# Patient Record
Sex: FEMALE | Race: WHITE | Employment: FULL TIME | URBAN - METROPOLITAN AREA
[De-identification: names, ages, dates, MRNs, and addresses within clinical notes are randomized per-mention and may not be internally consistent; named-entity substitution may affect disease eponyms.]

---

## 2018-06-05 ENCOUNTER — OFFICE VISIT (OUTPATIENT)
Dept: ORTHOPEDIC SURGERY | Age: 43
End: 2018-06-05

## 2018-06-05 VITALS — HEIGHT: 64 IN | BODY MASS INDEX: 24.24 KG/M2 | WEIGHT: 142 LBS

## 2018-06-05 DIAGNOSIS — M22.42 CHONDROMALACIA OF LEFT PATELLA: Primary | ICD-10-CM

## 2018-06-05 DIAGNOSIS — M25.562 LEFT KNEE PAIN, UNSPECIFIED CHRONICITY: ICD-10-CM

## 2018-06-05 PROCEDURE — L1812 KO ELASTIC W/JOINTS PRE OTS: HCPCS | Performed by: INTERNAL MEDICINE

## 2018-06-05 PROCEDURE — 99203 OFFICE O/P NEW LOW 30 MIN: CPT | Performed by: INTERNAL MEDICINE

## 2018-06-05 RX ORDER — MELOXICAM 15 MG/1
15 TABLET ORAL DAILY
Qty: 30 TABLET | Refills: 1 | Status: SHIPPED | OUTPATIENT
Start: 2018-06-05

## 2018-07-11 ENCOUNTER — OFFICE VISIT (OUTPATIENT)
Dept: ORTHOPEDIC SURGERY | Age: 43
End: 2018-07-11

## 2018-07-11 VITALS — WEIGHT: 142 LBS | HEIGHT: 64 IN | BODY MASS INDEX: 24.24 KG/M2

## 2018-07-11 DIAGNOSIS — M25.562 ACUTE PAIN OF LEFT KNEE: ICD-10-CM

## 2018-07-11 DIAGNOSIS — M22.42 CHONDROMALACIA OF LEFT PATELLA: ICD-10-CM

## 2018-07-11 DIAGNOSIS — S83.242A TEAR OF MEDIAL MENISCUS OF LEFT KNEE, CURRENT, UNSPECIFIED TEAR TYPE, INITIAL ENCOUNTER: Primary | ICD-10-CM

## 2018-07-11 PROCEDURE — 99213 OFFICE O/P EST LOW 20 MIN: CPT | Performed by: INTERNAL MEDICINE

## 2018-07-12 NOTE — PROGRESS NOTES
r/o [facility]         Manolo Cherry MD.      Disclaimer: \"This note was dictated with voice recognition software. Though review and correction are routine, we apologize for any errors. \"

## 2018-07-16 ENCOUNTER — OFFICE VISIT (OUTPATIENT)
Dept: ORTHOPEDIC SURGERY | Age: 43
End: 2018-07-16

## 2018-07-16 VITALS — BODY MASS INDEX: 26.13 KG/M2 | WEIGHT: 142 LBS | HEIGHT: 62 IN

## 2018-07-16 DIAGNOSIS — M22.42 CHONDROMALACIA OF LEFT PATELLA: Primary | ICD-10-CM

## 2018-07-16 DIAGNOSIS — M67.52 PLICA OF KNEE, LEFT: ICD-10-CM

## 2018-07-16 PROCEDURE — 99213 OFFICE O/P EST LOW 20 MIN: CPT | Performed by: INTERNAL MEDICINE

## 2018-07-16 NOTE — PROGRESS NOTES
Chief Complaint:   Chief Complaint   Patient presents with    Knee Pain     f/u MRI results L knee pain          History of Present Illness:       Patient is a 38 y.o. female returns follow up for the above complaint. The patient was last seen approximately 1 weeksago. The symptoms show no change since the last visit. The patient has had further testing for the problem. She does not perceive any mechanical symptoms about the knee. Past Medical History:        Past Medical History:   Diagnosis Date    Anxiety     Depression     Hypertension     Osteopenia     Rapid heart beat         Present Medications:         Current Outpatient Prescriptions   Medication Sig Dispense Refill    meloxicam (MOBIC) 15 MG tablet Take 1 tablet by mouth daily For 10 days then daily when necessary thereafter 30 tablet 1    levothyroxine (SYNTHROID) 137 MCG tablet Take 137 mcg by mouth daily. 90 tablet 0    levothyroxine (SYNTHROID) 125 MCG tablet Take 1 tablet by mouth every other day. 30 tablet 3    diltiazem (DILTIA XT) 120 MG XR capsule Take 120 mg by mouth daily.  paroxetine (PAXIL CR) 25 MG CR tablet Take 25 mg by mouth every morning.  Cholecalciferol (VITAMIN D) 2000 UNIT CAPS Take 1 tablet by mouth daily. No current facility-administered medications for this visit. Allergies:      No Known Allergies        Review of Systems:    Pertinent items are noted in HPI       Vital Signs: There were no vitals filed for this visit. General Exam:     Constitutional: Patient is adequately groomed with no evidence of malnutrition    Physical Exam: left knee      Primary Exam:    Inspection:  No deformity atrophy or effusion      Palpation:  No palpable plica      Range of Motion:  Full range symmetric without pain      Strength:  Normal with SLR      Special Tests:  Negative patella apprehension      Skin: There are no rashes, ulcerations or lesions.       Gait: nonantalgic     Neurovascular - willian when necessary         Orders:        Orders Placed This Encounter   Procedures    Ambulatory referral to Physical Therapy     Referral Priority:   Routine     Referral Type:   Eval and Treat     Referral Reason:   Specialty Services Required     Requested Specialty:   Physical Therapy     Number of Visits Requested:   1         Lucero Velez MD.      Celio Fernandez: \"This note was dictated with voice recognition software. Though review and correction are routine, we apologize for any errors. \"

## 2018-07-19 PROBLEM — M22.42 CHONDROMALACIA OF LEFT PATELLA: Status: ACTIVE | Noted: 2018-07-19

## 2021-02-24 ENCOUNTER — APPOINTMENT (OUTPATIENT)
Dept: GENERAL RADIOLOGY | Age: 46
End: 2021-02-24
Payer: COMMERCIAL

## 2021-02-24 ENCOUNTER — HOSPITAL ENCOUNTER (EMERGENCY)
Age: 46
Discharge: HOME | End: 2021-02-24
Attending: EMERGENCY MEDICINE
Payer: COMMERCIAL

## 2021-02-24 VITALS
SYSTOLIC BLOOD PRESSURE: 160 MMHG | RESPIRATION RATE: 16 BRPM | HEART RATE: 72 BPM | DIASTOLIC BLOOD PRESSURE: 110 MMHG | TEMPERATURE: 97.4 F | OXYGEN SATURATION: 100 %

## 2021-02-24 DIAGNOSIS — R07.9 CHEST PAIN, UNSPECIFIED TYPE: Primary | ICD-10-CM

## 2021-02-24 LAB
A/G RATIO: 1.9 (ref 1.1–2.2)
ALBUMIN SERPL-MCNC: 4.9 G/DL (ref 3.4–5)
ALP BLD-CCNC: 72 U/L (ref 40–129)
ALT SERPL-CCNC: 21 U/L (ref 10–40)
ANION GAP SERPL CALCULATED.3IONS-SCNC: 10 MMOL/L (ref 3–16)
AST SERPL-CCNC: 24 U/L (ref 15–37)
BASOPHILS ABSOLUTE: 0 K/UL (ref 0–0.2)
BASOPHILS RELATIVE PERCENT: 0.5 %
BILIRUB SERPL-MCNC: 0.4 MG/DL (ref 0–1)
BUN BLDV-MCNC: 10 MG/DL (ref 7–20)
CALCIUM SERPL-MCNC: 9.7 MG/DL (ref 8.3–10.6)
CHLORIDE BLD-SCNC: 102 MMOL/L (ref 99–110)
CO2: 26 MMOL/L (ref 21–32)
CREAT SERPL-MCNC: 0.8 MG/DL (ref 0.6–1.1)
EKG ATRIAL RATE: 69 BPM
EKG DIAGNOSIS: NORMAL
EKG P AXIS: 49 DEGREES
EKG P-R INTERVAL: 150 MS
EKG Q-T INTERVAL: 408 MS
EKG QRS DURATION: 90 MS
EKG QTC CALCULATION (BAZETT): 437 MS
EKG R AXIS: -42 DEGREES
EKG T AXIS: 24 DEGREES
EKG VENTRICULAR RATE: 69 BPM
EOSINOPHILS ABSOLUTE: 0.1 K/UL (ref 0–0.6)
EOSINOPHILS RELATIVE PERCENT: 1.6 %
GFR AFRICAN AMERICAN: >60
GFR NON-AFRICAN AMERICAN: >60
GLOBULIN: 2.6 G/DL
GLUCOSE BLD-MCNC: 99 MG/DL (ref 70–99)
HCT VFR BLD CALC: 43.9 % (ref 36–48)
HEMOGLOBIN: 14.9 G/DL (ref 12–16)
LIPASE: 28 U/L (ref 13–60)
LYMPHOCYTES ABSOLUTE: 1.3 K/UL (ref 1–5.1)
LYMPHOCYTES RELATIVE PERCENT: 21.4 %
MCH RBC QN AUTO: 31 PG (ref 26–34)
MCHC RBC AUTO-ENTMCNC: 34 G/DL (ref 31–36)
MCV RBC AUTO: 91 FL (ref 80–100)
MONOCYTES ABSOLUTE: 0.4 K/UL (ref 0–1.3)
MONOCYTES RELATIVE PERCENT: 6.8 %
NEUTROPHILS ABSOLUTE: 4.1 K/UL (ref 1.7–7.7)
NEUTROPHILS RELATIVE PERCENT: 69.7 %
PDW BLD-RTO: 13.3 % (ref 12.4–15.4)
PLATELET # BLD: 331 K/UL (ref 135–450)
PMV BLD AUTO: 7.4 FL (ref 5–10.5)
POTASSIUM REFLEX MAGNESIUM: 4.3 MMOL/L (ref 3.5–5.1)
PRO-BNP: 33 PG/ML (ref 0–124)
RBC # BLD: 4.82 M/UL (ref 4–5.2)
SODIUM BLD-SCNC: 138 MMOL/L (ref 136–145)
TOTAL PROTEIN: 7.5 G/DL (ref 6.4–8.2)
TROPONIN: <0.01 NG/ML
WBC # BLD: 5.9 K/UL (ref 4–11)

## 2021-02-24 PROCEDURE — 71046 X-RAY EXAM CHEST 2 VIEWS: CPT

## 2021-02-24 PROCEDURE — 83690 ASSAY OF LIPASE: CPT

## 2021-02-24 PROCEDURE — 83880 ASSAY OF NATRIURETIC PEPTIDE: CPT

## 2021-02-24 PROCEDURE — 99283 EMERGENCY DEPT VISIT LOW MDM: CPT

## 2021-02-24 PROCEDURE — 6370000000 HC RX 637 (ALT 250 FOR IP): Performed by: PHYSICIAN ASSISTANT

## 2021-02-24 PROCEDURE — 80053 COMPREHEN METABOLIC PANEL: CPT

## 2021-02-24 PROCEDURE — 93005 ELECTROCARDIOGRAM TRACING: CPT | Performed by: EMERGENCY MEDICINE

## 2021-02-24 PROCEDURE — 84484 ASSAY OF TROPONIN QUANT: CPT

## 2021-02-24 PROCEDURE — 85025 COMPLETE CBC W/AUTO DIFF WBC: CPT

## 2021-02-24 RX ORDER — PANTOPRAZOLE SODIUM 20 MG/1
40 TABLET, DELAYED RELEASE ORAL DAILY
Qty: 30 TABLET | Refills: 0 | Status: SHIPPED | OUTPATIENT
Start: 2021-02-24

## 2021-02-24 RX ORDER — ASPIRIN 81 MG/1
324 TABLET, CHEWABLE ORAL ONCE
Status: COMPLETED | OUTPATIENT
Start: 2021-02-24 | End: 2021-02-24

## 2021-02-24 RX ADMIN — ASPIRIN 324 MG: 81 TABLET, CHEWABLE ORAL at 12:01

## 2021-02-24 RX ADMIN — LIDOCAINE HYDROCHLORIDE: 20 SOLUTION ORAL; TOPICAL at 12:01

## 2021-02-24 ASSESSMENT — ENCOUNTER SYMPTOMS
EYE REDNESS: 0
ABDOMINAL PAIN: 0
SHORTNESS OF BREATH: 0
COUGH: 1
VOMITING: 0
DIARRHEA: 0
NAUSEA: 0

## 2021-02-24 ASSESSMENT — PAIN DESCRIPTION - LOCATION: LOCATION: CHEST

## 2021-02-24 ASSESSMENT — PAIN SCALES - GENERAL: PAINLEVEL_OUTOF10: 8

## 2021-02-24 ASSESSMENT — PAIN DESCRIPTION - PAIN TYPE: TYPE: ACUTE PAIN

## 2021-02-24 ASSESSMENT — PAIN DESCRIPTION - DESCRIPTORS: DESCRIPTORS: ACHING;DISCOMFORT

## 2021-02-24 NOTE — ED PROVIDER NOTES
[facility]          PHYSICIAN ASSISTANT NOTE       Date of evaluation: 2/24/2021    Chief Complaint     Chest Pain      History of Present Illness     Mode Milton is a 39 y.o. female with PMH of Anxiety, Depression, Hypothyroidism who presents with Chest pain. Patient states that she woke up at 6:15 AM this morning and subsequently developed an intermittent squeezing substernal chest discomfort without radiation. She reports that intermittent squeezing, she feels a pressure-like sensation in this area. She initially attributed her symptoms to possible heartburn and took 2 Tums but without relief. She states that she has had symptoms similar to this in the past but symptoms were brief and resolved spontaneously. She states that she went to school nurse and was recommended she come to the emergency department. She denies any fevers, dizziness, diaphoresis, numbness or tingling, shortness of breath, abdominal pain, nausea or vomiting, change in bowel or bladder habits, leg swelling or pain. Patient states that she did have bronchitis a several weeks ago and was placed on Advair inhaler for maintenance. She reports a mild persistent cough. She did have some pleuritic chest discomfort several weeks ago but this is resolved. Patient states that she did have a CT scan with oral and IV contrast yesterday to reevaluate a previous \"fat deposit\" in her abdomen. Review of Systems     Review of Systems   Constitutional: Negative for chills, diaphoresis and fever. HENT: Negative for congestion. Eyes: Negative for redness. Respiratory: Positive for cough. Negative for shortness of breath. Cardiovascular: Positive for chest pain. Negative for leg swelling. Gastrointestinal: Negative for abdominal pain, diarrhea, nausea and vomiting. Genitourinary: Negative for difficulty urinating. Musculoskeletal: Negative for gait problem. Skin: Negative for wound. Neurological: Negative for dizziness. Psychiatric/Behavioral: The patient is not nervous/anxious. Past Medical, Surgical, Family, and Social History     She has a past medical history of Anxiety, Depression, Hypertension, Osteopenia, and Rapid heart beat. She has a past surgical history that includes Thyroidectomy (12/10). Her family history includes Cancer in her father and maternal grandmother; Heart Disease in her paternal grandfather; High Blood Pressure in her mother; Osteoporosis in her maternal grandmother. She reports that she quit smoking about 22 years ago. She has never used smokeless tobacco. She reports current alcohol use. Medications     Previous Medications    CHOLECALCIFEROL (VITAMIN D) 2000 UNIT CAPS    Take 1 tablet by mouth daily. DILTIAZEM (DILTIA XT) 120 MG XR CAPSULE    Take 120 mg by mouth daily. LEVOTHYROXINE (SYNTHROID) 125 MCG TABLET    Take 1 tablet by mouth every other day. LEVOTHYROXINE (SYNTHROID) 137 MCG TABLET    Take 137 mcg by mouth daily. MELOXICAM (MOBIC) 15 MG TABLET    Take 1 tablet by mouth daily For 10 days then daily when necessary thereafter    PAROXETINE (PAXIL CR) 25 MG CR TABLET    Take 25 mg by mouth every morning. Allergies     She has No Known Allergies. Physical Exam     INITIAL VITALS: BP: (!) 160/110,Temp: 97.4 °F (36.3 °C), Pulse: 72, Resp: 16, SpO2: 100 %   Physical Exam  Vitals signs and nursing note reviewed. Constitutional:       General: She is not in acute distress. HENT:      Head: Normocephalic and atraumatic. Mouth/Throat:      Pharynx: Oropharynx is clear. Eyes:      Extraocular Movements: Extraocular movements intact. Conjunctiva/sclera: Conjunctivae normal.   Neck:      Musculoskeletal: Neck supple. Cardiovascular:      Rate and Rhythm: Normal rate and regular rhythm. Pulmonary:      Effort: Pulmonary effort is normal. No respiratory distress. Chloride 102 99 - 110 mmol/L    CO2 26 21 - 32 mmol/L    Anion Gap 10 3 - 16    Glucose 99 70 - 99 mg/dL    BUN 10 7 - 20 mg/dL    CREATININE 0.8 0.6 - 1.1 mg/dL    GFR Non-African American >60 >60    GFR African American >60 >60    Calcium 9.7 8.3 - 10.6 mg/dL    Total Protein 7.5 6.4 - 8.2 g/dL    Albumin 4.9 3.4 - 5.0 g/dL    Albumin/Globulin Ratio 1.9 1.1 - 2.2    Total Bilirubin 0.4 0.0 - 1.0 mg/dL    Alkaline Phosphatase 72 40 - 129 U/L    ALT 21 10 - 40 U/L    AST 24 15 - 37 U/L    Globulin 2.6 g/dL   Lipase   Result Value Ref Range    Lipase 28.0 13.0 - 60.0 U/L   Troponin   Result Value Ref Range    Troponin <0.01 <0.01 ng/mL   Brain Natriuretic Peptide   Result Value Ref Range    Pro-BNP 33 0 - 124 pg/mL   EKG 12 Lead   Result Value Ref Range    Ventricular Rate 69 BPM    Atrial Rate 69 BPM    P-R Interval 150 ms    QRS Duration 90 ms    Q-T Interval 408 ms    QTc Calculation (Bazett) 437 ms    P Axis 49 degrees    R Axis -42 degrees    T Axis 24 degrees    Diagnosis       EKG performed in ER and to be interpreted by ER physician. Confirmed by MD, ER ([id number]),  Damon Adair ([id number]) on 2/24/2021 10:21:39 AM       RECENT VITALS:  BP: (!) 160/110, Temp: 97.4 °F (36.3 °C), Pulse: 72,Resp: 16, SpO2: 100 %     Procedures         ED Course     Nursing Notes, Past Medical Hx, Past Surgical Hx, Social Hx, Allergies, and Family Hx were reviewed.     The patient was given the followingmedications:  Orders Placed This Encounter   Medications    aspirin chewable tablet 324 mg    aluminum & magnesium hydroxide-simethicone (MAALOX) 30 mL, lidocaine viscous hcl (XYLOCAINE) 5 mL (GI COCKTAIL)    pantoprazole (PROTONIX) 20 MG tablet     Sig: Take 2 tablets by mouth daily     Dispense:  30 tablet     Refill:  0       CONSULTS:  None    MEDICAL DECISION Braulio Mckenzie / Megan Kramer / Marisela Mathew John Singh is a 39 y.o. female with PMH of Anxiety, Depression, Hypothyroidism who presents with intermittent substernal squeezing chest discomfort for the last couple of hours. Patient also reports pressure sensation in her chest.  She denies any other associated symptoms. On physical exam, patient is hypertensive with blood pressure 160/110. Vitals otherwise normal.  Heart rhythm regular. Lungs clear auscultation. Abdomen soft and nontender. EKG obtained revealed normal sinus rhythm, left axis deviation, no acute ischemic changes. IV access was established. Labs including CBC, CMP, Lipase, Troponin, BNP were obtained. Labs unremarkable. CXR negative. Patient was given a GI cocktail as well as 324 mg chewable aspirin. She reports that she has had no further squeezing discomfort since taking the above medications. Patient with atypical chest discomfort. She has a heart score of 0. Thus, she is low risk for adverse cardiac outcome. PERC score of zero. We will plan to place patient on Protonix and have her follow-up with her primary care provider. She is stable for discharge home at this time with strict return precautions. This patient was also evaluated by the attending physician. All care plans were discussed and agreed upon. Clinical Impression     1. Chest pain, unspecified type        Disposition     PATIENT REFERRED TO:  Ronny Mckenzie MD  [facility] [address] [facility] [address]  [phone]    Schedule an appointment as soon as possible for a visit       [facility] Emergency Department  [address]  [address]  [phone]    If symptoms worsen      DISCHARGE MEDICATIONS:  New Prescriptions    PANTOPRAZOLE (PROTONIX) 20 MG TABLET    Take 2 tablets by mouth daily        DISPOSITION  Discharge.        Gladys Dalal PA-C  02/24/21 [id number]

## 2021-02-24 NOTE — ED PROVIDER NOTES
ED Attending Attestation Note     Date of evaluation: 2/24/2021    This patient was seen by the advance practice provider. I have seen and examined the patient, agree with the workup, evaluation, management and diagnosis. The care plan has been discussed. I have reviewed the ECG and concur with the NOELLE's interpretation. My assessment reveals patient with squeezing CP since this am, took antacid for it. No reproducible tenderness in abdomen or chest.  Feels like heartburn following GI cocktail here and cardiac workup negative in setting of HEART score = 0. Will start PPI and have patient followup with PCP.      Melissa Kyle MD  02/24/21 [id number]

## 2022-06-10 LAB
ANTI-THYROGLOB ABS: 15 IU/ML
FERRITIN: 72.9 NG/ML (ref 15–150)
T3 FREE: 6.4 PG/ML (ref 2.3–4.2)
T3 TOTAL: 2.26 NG/ML (ref 0.8–2)
T4 FREE: 1.4 NG/DL (ref 0.9–1.8)
THYROID PEROXIDASE (TPO) ABS: 8 IU/ML
TSH REFLEX: 0.27 UIU/ML (ref 0.27–4.2)
VITAMIN B-12: 1089 PG/ML (ref 211–911)
VITAMIN D 25-HYDROXY: 53.9 NG/ML

## 2022-06-17 LAB — T3 REVERSE: 23.2 NG/DL (ref 9–27)

## 2024-04-12 ENCOUNTER — HOSPITAL ENCOUNTER (OUTPATIENT)
Dept: MAMMOGRAPHY | Age: 49
Discharge: HOME | End: 2024-04-12

## 2024-04-12 VITALS — BODY MASS INDEX: 26.13 KG/M2 | HEIGHT: 62 IN | WEIGHT: 142 LBS

## 2024-04-12 DIAGNOSIS — Z12.31 VISIT FOR SCREENING MAMMOGRAM: ICD-10-CM

## 2024-04-12 PROCEDURE — 77067 SCR MAMMO BI INCL CAD: CPT

## 2024-04-12 PROCEDURE — 77063 BREAST TOMOSYNTHESIS BI: CPT
